# Patient Record
Sex: FEMALE | Race: WHITE | NOT HISPANIC OR LATINO
[De-identification: names, ages, dates, MRNs, and addresses within clinical notes are randomized per-mention and may not be internally consistent; named-entity substitution may affect disease eponyms.]

---

## 2017-04-26 ENCOUNTER — MEDICATION RENEWAL (OUTPATIENT)
Age: 52
End: 2017-04-26

## 2017-05-02 ENCOUNTER — RX RENEWAL (OUTPATIENT)
Age: 52
End: 2017-05-02

## 2017-05-24 ENCOUNTER — APPOINTMENT (OUTPATIENT)
Dept: PULMONOLOGY | Facility: CLINIC | Age: 52
End: 2017-05-24

## 2017-05-24 VITALS — OXYGEN SATURATION: 94 % | HEART RATE: 96 BPM | SYSTOLIC BLOOD PRESSURE: 128 MMHG | DIASTOLIC BLOOD PRESSURE: 82 MMHG

## 2017-05-24 VITALS — BODY MASS INDEX: 47.75 KG/M2 | WEIGHT: 213 LBS

## 2017-05-24 RX ORDER — CEPHALEXIN 500 MG/1
500 CAPSULE ORAL
Qty: 14 | Refills: 0 | Status: DISCONTINUED | COMMUNITY
Start: 2017-04-20

## 2017-05-24 RX ORDER — CEFDINIR 300 MG/1
300 CAPSULE ORAL
Qty: 20 | Refills: 0 | Status: DISCONTINUED | COMMUNITY
Start: 2017-05-20

## 2017-05-24 RX ORDER — LEVOFLOXACIN 500 MG/1
500 TABLET, FILM COATED ORAL
Qty: 10 | Refills: 0 | Status: DISCONTINUED | COMMUNITY
Start: 2017-05-13

## 2017-09-21 ENCOUNTER — APPOINTMENT (OUTPATIENT)
Dept: PULMONOLOGY | Facility: CLINIC | Age: 52
End: 2017-09-21

## 2019-08-08 ENCOUNTER — APPOINTMENT (OUTPATIENT)
Dept: PULMONOLOGY | Facility: CLINIC | Age: 54
End: 2019-08-08
Payer: COMMERCIAL

## 2019-08-08 VITALS
HEART RATE: 113 BPM | SYSTOLIC BLOOD PRESSURE: 140 MMHG | BODY MASS INDEX: 47.24 KG/M2 | WEIGHT: 210 LBS | DIASTOLIC BLOOD PRESSURE: 90 MMHG | HEIGHT: 56 IN | OXYGEN SATURATION: 93 %

## 2019-08-08 VITALS — RESPIRATION RATE: 16 BRPM

## 2019-08-08 DIAGNOSIS — E66.9 OBESITY, UNSPECIFIED: ICD-10-CM

## 2019-08-08 PROCEDURE — 94060 EVALUATION OF WHEEZING: CPT

## 2019-08-08 PROCEDURE — 94664 DEMO&/EVAL PT USE INHALER: CPT | Mod: 59

## 2019-08-08 PROCEDURE — 99215 OFFICE O/P EST HI 40 MIN: CPT | Mod: 25

## 2019-08-08 RX ORDER — BUDESONIDE AND FORMOTEROL FUMARATE DIHYDRATE 160; 4.5 UG/1; UG/1
160-4.5 AEROSOL RESPIRATORY (INHALATION) TWICE DAILY
Refills: 5 | Status: DISCONTINUED | COMMUNITY
Start: 2017-05-02 | End: 2019-08-08

## 2019-08-08 RX ORDER — TAMOXIFEN CITRATE 20 MG/1
20 TABLET, FILM COATED ORAL
Qty: 30 | Refills: 0 | Status: DISCONTINUED | COMMUNITY
Start: 2017-04-28 | End: 2019-08-08

## 2019-08-08 NOTE — HISTORY OF PRESENT ILLNESS
[FreeTextEntry1] : The patient is a 54-year-old female last seen here 2 years ago. She is morbidly obese and has a history of severe obstructive sleep apnea for which she is not currently using CPAP. She has it at home.  She has a history of moderate COPD currently on generic Advair. She was complaining of increasing shortness of breath with a dry cough. She was seen in urgent care where chest x-ray was taken. She was told she had "the beginnings of pneumonia" and was given antibiotics as well as a course of prednisone. She is currently on the prednisone. She is better but still remains with a dry cough.

## 2019-08-08 NOTE — PHYSICAL EXAM
[General Appearance - Well Developed] : well developed [No Deformities] : no deformities [General Appearance - Well Nourished] : well nourished [General Appearance - In No Acute Distress] : no acute distress [Normal Conjunctiva] : the conjunctiva exhibited no abnormalities [Neck Appearance] : the appearance of the neck was normal [Heart Rate And Rhythm] : heart rate and rhythm were normal [Heart Sounds] : normal S1 and S2 [Jugular Venous Distention Increased] : there was no jugular-venous distention [Arterial Pulses Normal] : the arterial pulses were normal [Murmurs] : no murmurs present [Exaggerated Use Of Accessory Muscles For Inspiration] : no accessory muscle use [] : no respiratory distress [Respiration, Rhythm And Depth] : normal respiratory rhythm and effort [Bowel Sounds] : normal bowel sounds [Auscultation Breath Sounds / Voice Sounds] : lungs were clear to auscultation bilaterally [Abdomen Soft] : soft [Abnormal Walk] : normal gait [Skin Color & Pigmentation] : normal skin color and pigmentation [Deep Tendon Reflexes (DTR)] : deep tendon reflexes were 2+ and symmetric [Cranial Nerves] : cranial nerves 2-12 were intact [Oriented To Time, Place, And Person] : oriented to person, place, and time [Normal Oral Mucosa] : normal oral mucosa [Impaired Insight] : insight and judgment were intact [No Oral Cyanosis] : no oral cyanosis [Cyanosis, Localized] : no localized cyanosis [Nail Clubbing] : no clubbing of the fingernails [FreeTextEntry1] : obese

## 2019-08-08 NOTE — CONSULT LETTER
[Courtesy Letter:] : I had the pleasure of seeing your patient, [unfilled], in my office today. [Please see my note below.] : Please see my note below. [Dear  ___] : Dear  [unfilled], [Sincerely,] : Sincerely, [Consult Closing:] : Thank you very much for allowing me to participate in the care of this patient.  If you have any questions, please do not hesitate to contact me. [FreeTextEntry3] : Angelina Montelongo MD FCCP\par D-ABSM\par ABIM board certified in  Pulmonary diseases, Sleep medicine\par Internal medicine\par

## 2019-08-08 NOTE — REVIEW OF SYSTEMS
[Fatigue] : fatigue [Recent Wt Gain (___ Lbs)] : recent [unfilled] ~Ulb weight gain [As Noted in HPI] : as noted in HPI [Itchy Eyes] : itching of ~T the eyes [Watery Eyes] : ~T eyes watering / discharge [Heartburn] : heartburn [Reflux] : reflux [Snoring] : snoring [Witnessed Apneas] : demonstrated ~M apnea [Nonrestorative Sleep] : nonrestorative sleep [Hypersomnolence] : sleeping much more than usual [Fever] : no fever [Chills] : no chills [Nasal Congestion] : no nasal congestion [Poor Appetite] : normal appetite  [Postnasal Drip] : no postnasal drip [Sinus Problems] : no sinus problems [PND] : no PND [Orthopnea] : no orthopnea [Murmurs] : no murmurs were heard [Dysrhythmia] : no dysrhythmia [Palpitations] : no palpitations [Edema] : ~T edema was not present [Indigestion] : no indigestion [Nasal Discharge] : no nasal discharge [Dysphagia] : no dysphagia [Nausea] : no nausea [Vomiting] : no vomiting [Nocturia] : no nocturia [Raynaud] : no Raynaud's phenomenon was observed [Frequency] : no change in urinary frequency [Headache] : no headache [Scleroderma] : no scleroderma [Dizziness] : no dizziness [Syncope] : no fainting

## 2019-08-27 ENCOUNTER — TRANSCRIPTION ENCOUNTER (OUTPATIENT)
Age: 54
End: 2019-08-27

## 2019-08-27 ENCOUNTER — OUTPATIENT (OUTPATIENT)
Dept: OUTPATIENT SERVICES | Facility: HOSPITAL | Age: 54
LOS: 1 days | Discharge: HOME | End: 2019-08-27
Payer: COMMERCIAL

## 2019-08-27 VITALS
DIASTOLIC BLOOD PRESSURE: 60 MMHG | OXYGEN SATURATION: 96 % | RESPIRATION RATE: 18 BRPM | HEART RATE: 60 BPM | SYSTOLIC BLOOD PRESSURE: 141 MMHG

## 2019-08-27 VITALS
SYSTOLIC BLOOD PRESSURE: 171 MMHG | HEART RATE: 100 BPM | OXYGEN SATURATION: 93 % | TEMPERATURE: 98 F | DIASTOLIC BLOOD PRESSURE: 85 MMHG | RESPIRATION RATE: 20 BRPM

## 2019-08-27 DIAGNOSIS — R94.39 ABNORMAL RESULT OF OTHER CARDIOVASCULAR FUNCTION STUDY: ICD-10-CM

## 2019-08-27 DIAGNOSIS — Z90.13 ACQUIRED ABSENCE OF BILATERAL BREASTS AND NIPPLES: Chronic | ICD-10-CM

## 2019-08-27 DIAGNOSIS — Z90.710 ACQUIRED ABSENCE OF BOTH CERVIX AND UTERUS: Chronic | ICD-10-CM

## 2019-08-27 DIAGNOSIS — Z98.890 OTHER SPECIFIED POSTPROCEDURAL STATES: Chronic | ICD-10-CM

## 2019-08-27 LAB
ANION GAP SERPL CALC-SCNC: 11 MMOL/L — SIGNIFICANT CHANGE UP (ref 5–17)
APTT BLD: 29.6 SEC — SIGNIFICANT CHANGE UP (ref 27.5–36.3)
BUN SERPL-MCNC: 9 MG/DL — SIGNIFICANT CHANGE UP (ref 8–20)
CALCIUM SERPL-MCNC: 9.8 MG/DL — SIGNIFICANT CHANGE UP (ref 8.6–10.2)
CHLORIDE SERPL-SCNC: 102 MMOL/L — SIGNIFICANT CHANGE UP (ref 98–107)
CO2 SERPL-SCNC: 29 MMOL/L — SIGNIFICANT CHANGE UP (ref 22–29)
CREAT SERPL-MCNC: 0.56 MG/DL — SIGNIFICANT CHANGE UP (ref 0.5–1.3)
GLUCOSE SERPL-MCNC: 104 MG/DL — SIGNIFICANT CHANGE UP (ref 70–115)
HCG SERPL-ACNC: <4 MIU/ML — SIGNIFICANT CHANGE UP
HCT VFR BLD CALC: 37.7 % — SIGNIFICANT CHANGE UP (ref 34.5–45)
HGB BLD-MCNC: 11.4 G/DL — LOW (ref 11.5–15.5)
INR BLD: 1.11 RATIO — SIGNIFICANT CHANGE UP (ref 0.88–1.16)
MAGNESIUM SERPL-MCNC: 2 MG/DL — SIGNIFICANT CHANGE UP (ref 1.6–2.6)
MCHC RBC-ENTMCNC: 25.4 PG — LOW (ref 27–34)
MCHC RBC-ENTMCNC: 30.2 GM/DL — LOW (ref 32–36)
MCV RBC AUTO: 84 FL — SIGNIFICANT CHANGE UP (ref 80–100)
PLATELET # BLD AUTO: 283 K/UL — SIGNIFICANT CHANGE UP (ref 150–400)
POTASSIUM SERPL-MCNC: 4.4 MMOL/L — SIGNIFICANT CHANGE UP (ref 3.5–5.3)
POTASSIUM SERPL-SCNC: 4.4 MMOL/L — SIGNIFICANT CHANGE UP (ref 3.5–5.3)
PROTHROM AB SERPL-ACNC: 12.8 SEC — SIGNIFICANT CHANGE UP (ref 10–12.9)
RBC # BLD: 4.49 M/UL — SIGNIFICANT CHANGE UP (ref 3.8–5.2)
RBC # FLD: 15.5 % — HIGH (ref 10.3–14.5)
SODIUM SERPL-SCNC: 142 MMOL/L — SIGNIFICANT CHANGE UP (ref 135–145)
WBC # BLD: 8.86 K/UL — SIGNIFICANT CHANGE UP (ref 3.8–10.5)
WBC # FLD AUTO: 8.86 K/UL — SIGNIFICANT CHANGE UP (ref 3.8–10.5)

## 2019-08-27 PROCEDURE — 93458 L HRT ARTERY/VENTRICLE ANGIO: CPT

## 2019-08-27 PROCEDURE — 85610 PROTHROMBIN TIME: CPT

## 2019-08-27 PROCEDURE — C1894: CPT

## 2019-08-27 PROCEDURE — C1769: CPT

## 2019-08-27 PROCEDURE — 84702 CHORIONIC GONADOTROPIN TEST: CPT

## 2019-08-27 PROCEDURE — 36415 COLL VENOUS BLD VENIPUNCTURE: CPT

## 2019-08-27 PROCEDURE — 83735 ASSAY OF MAGNESIUM: CPT

## 2019-08-27 PROCEDURE — 85730 THROMBOPLASTIN TIME PARTIAL: CPT

## 2019-08-27 PROCEDURE — 99153 MOD SED SAME PHYS/QHP EA: CPT

## 2019-08-27 PROCEDURE — 93010 ELECTROCARDIOGRAM REPORT: CPT

## 2019-08-27 PROCEDURE — 80048 BASIC METABOLIC PNL TOTAL CA: CPT

## 2019-08-27 PROCEDURE — 93005 ELECTROCARDIOGRAM TRACING: CPT

## 2019-08-27 PROCEDURE — 85027 COMPLETE CBC AUTOMATED: CPT

## 2019-08-27 PROCEDURE — 99152 MOD SED SAME PHYS/QHP 5/>YRS: CPT

## 2019-08-27 PROCEDURE — C1887: CPT

## 2019-08-27 RX ORDER — SODIUM CHLORIDE 9 MG/ML
300 INJECTION INTRAMUSCULAR; INTRAVENOUS; SUBCUTANEOUS
Refills: 0 | Status: DISCONTINUED | OUTPATIENT
Start: 2019-08-27 | End: 2019-09-11

## 2019-08-27 RX ORDER — OXYBUTYNIN CHLORIDE 5 MG
1 TABLET ORAL
Qty: 0 | Refills: 0 | DISCHARGE

## 2019-08-27 RX ORDER — BUDESONIDE AND FORMOTEROL FUMARATE DIHYDRATE 160; 4.5 UG/1; UG/1
2 AEROSOL RESPIRATORY (INHALATION)
Qty: 0 | Refills: 0 | DISCHARGE

## 2019-08-27 RX ADMIN — SODIUM CHLORIDE 75 MILLILITER(S): 9 INJECTION INTRAMUSCULAR; INTRAVENOUS; SUBCUTANEOUS at 18:34

## 2019-08-27 NOTE — DISCHARGE NOTE PROVIDER - HOSPITAL COURSE
s/p LHC via #6 RFA sheath    VENTRICLES: There were no left ventricular global or regional wall motion    abnormalities. Global left ventricular function was normal. EF estimated    was 60 %.    VALVES: MITRAL VALVE: The mitral valve exhibited trivial regurgitation    (less than 1+).    CORONARY VESSELS: The coronary circulation is right dominant.    LM:   --  LM: Normal.    LAD:   --  LAD: Normal.    CX:   --  Circumflex: Normal.    RCA:   --  RCA: Normal.    Tolerated procedure well w/o complications    Seen post procedure by Dr Valadez                    REVIEW OF SYSTEMS:    Denies SOB, CP, NV, HA, dizziness, palpitations, site pain        PHYSICAL EXAM: A&Ox3 NAD Skin warm and dry    NEURO: Speech intact +gag +swallow Tongue midline VILLAFANA    NECK: No JVD, trachea midline. Eupneic    HEART: RRR S1S2 no ectopy of CM    PULMONARY:  CTA sangita    ABDOMEN: Soft nontender X4 +BS Vdg/eating    EXTREMITIES:  RFA site: No bleed, hematoma, pain, ecchymosis or swelling Rt DP/PT+ s/p LHC via #6 RFA sheath    VENTRICLES: There were no left ventricular global or regional wall motion    abnormalities. Global left ventricular function was normal. EF estimated    was 60 %.    VALVES: MITRAL VALVE: The mitral valve exhibited trivial regurgitation    (less than 1+).    CORONARY VESSELS: The coronary circulation is right dominant.    LM:   --  LM: Normal.    LAD:   --  LAD: Normal.    CX:   --  Circumflex: Normal.    RCA:   --  RCA: Normal.    Tolerated procedure well w/o complications    Seen post procedure by Dr Valadez                    REVIEW OF SYSTEMS:    Denies SOB, CP, NV, HA, dizziness, palpitations, site pain OOB ambulating w/o complications        PHYSICAL EXAM: A&Ox3 NAD Skin warm and dry    NEURO: Speech intact +gag +swallow Tongue midline VILLAFANA    NECK: No JVD, trachea midline. Eupneic    HEART: RRR S1S2 no ectopy of CM    PULMONARY:  CTA sangita    ABDOMEN: Soft nontender X4 +BS Vdg/eating    EXTREMITIES:  RFA site: No bleed, hematoma, pain, ecchymosis or swelling Rt DP/PT+

## 2019-08-27 NOTE — H&P PST ADULT - HISTORY OF PRESENT ILLNESS
This is a 55 y/o female with h/o COPD, morbid obesity, SUNI (noncompliant with CPAP), breast CA s/p double mastectomy with no chemo/radiation, and strong family h/o of premature CAD (Brother CAD and MI 30-40s y/o) with c/o SOB.  Exercise stress test was stopped after stage 1 due to severe SOB.  NST was done and revealed moderate ischemia of the distal lateral/inferolateral wall and EF 57%.  Patient presents today for elective LHC +/- PCI with Dr. Valadez.

## 2019-08-27 NOTE — H&P PST ADULT - NSICDXPASTMEDICALHX_GEN_ALL_CORE_FT
PAST MEDICAL HISTORY:  Breast cancer s/p mastectomy (no chemo/radiation)    History of COPD     Morbid obesity     SUNI (obstructive sleep apnea)

## 2019-08-27 NOTE — DISCHARGE NOTE PROVIDER - CARE PROVIDERS DIRECT ADDRESSES
"Anesthesia Transfer of Care Note    Patient: Kylah Ruiz    Procedure(s) Performed: Procedure(s) (LRB):  PARATHYROIDECTOMY  1.5 hours  (Right)    Patient location: PACU    Anesthesia Type: general    Transport from OR: Transported from OR on room air with adequate spontaneous ventilation    Post pain: adequate analgesia    Post assessment: no apparent anesthetic complications    Post vital signs: stable    Level of consciousness: responds to stimulation    Nausea/Vomiting: no nausea/vomiting    Complications: none          Last vitals:   Visit Vitals    /63 (BP Location: Right arm, Patient Position: Sitting, BP Method: Automatic)    Pulse (!) 58    Temp 36.7 °C (98 °F)    Resp 16    Ht 5' 2" (1.575 m)    Wt 75.4 kg (166 lb 3.6 oz)    LMP 02/18/2017    SpO2 97%    Breastfeeding No    BMI 30.4 kg/m2     " ,DirectAddress_Unknown

## 2019-08-27 NOTE — DISCHARGE NOTE NURSING/CASE MANAGEMENT/SOCIAL WORK - PATIENT PORTAL LINK FT
You can access the FollowMyHealth Patient Portal offered by [facility] by registering at the following website: [email or web address]. By joining [employer]’s FollowMyHealth portal, you will also be able to view your health information using other applications (apps) compatible with our system.

## 2019-08-27 NOTE — H&P PST ADULT - ASSESSMENT
53 y/o female with severe SOB and moderate ischemia  of the distal lateral/inferolateral wall on NST for LHC +/- PCI with Dr. Valadez.

## 2019-08-27 NOTE — DISCHARGE NOTE PROVIDER - NSDCFUSCHEDAPPT_GEN_ALL_CORE_FT
OLIVA TORREZ ; 10/10/2019 ; [facility] OLIVA TORREZ ; 10/10/2019 ; NPP [facility]

## 2019-08-27 NOTE — H&P PST ADULT - NSICDXPASTSURGICALHX_GEN_ALL_CORE_FT
PAST SURGICAL HISTORY:  H/O bilateral mastectomy     H/O: hysterectomy     S/P breast reconstruction, bilateral

## 2019-08-27 NOTE — H&P PST ADULT - NSICDXFAMILYHX_GEN_ALL_CORE_FT
FAMILY HISTORY:  Mother  Still living? No  FHx: myocardial infarction, Age at diagnosis: 61-70    Sibling  Still living? Yes, Estimated age: Age Unknown  Family history of premature CAD, Age at diagnosis: 31-40  FHx: myocardial infarction, Age at diagnosis: 41-50

## 2019-08-30 DIAGNOSIS — I20.8 OTHER FORMS OF ANGINA PECTORIS: ICD-10-CM

## 2019-10-10 ENCOUNTER — APPOINTMENT (OUTPATIENT)
Dept: PULMONOLOGY | Facility: CLINIC | Age: 54
End: 2019-10-10

## 2022-01-28 PROBLEM — C50.919 MALIGNANT NEOPLASM OF UNSPECIFIED SITE OF UNSPECIFIED FEMALE BREAST: Chronic | Status: ACTIVE | Noted: 2019-08-27

## 2022-01-28 PROBLEM — G47.33 OBSTRUCTIVE SLEEP APNEA (ADULT) (PEDIATRIC): Chronic | Status: ACTIVE | Noted: 2019-08-27

## 2022-01-28 PROBLEM — Z87.09 PERSONAL HISTORY OF OTHER DISEASES OF THE RESPIRATORY SYSTEM: Chronic | Status: ACTIVE | Noted: 2019-08-27

## 2022-01-28 PROBLEM — E66.01 MORBID (SEVERE) OBESITY DUE TO EXCESS CALORIES: Chronic | Status: ACTIVE | Noted: 2019-08-27

## 2022-02-07 ENCOUNTER — APPOINTMENT (OUTPATIENT)
Dept: PULMONOLOGY | Facility: CLINIC | Age: 57
End: 2022-02-07
Payer: COMMERCIAL

## 2022-02-07 VITALS
HEIGHT: 57 IN | WEIGHT: 225 LBS | BODY MASS INDEX: 48.54 KG/M2 | SYSTOLIC BLOOD PRESSURE: 132 MMHG | RESPIRATION RATE: 16 BRPM | DIASTOLIC BLOOD PRESSURE: 84 MMHG | HEART RATE: 96 BPM | OXYGEN SATURATION: 98 %

## 2022-02-07 DIAGNOSIS — J12.82 COVID-19: ICD-10-CM

## 2022-02-07 DIAGNOSIS — U07.1 COVID-19: ICD-10-CM

## 2022-02-07 PROCEDURE — 99214 OFFICE O/P EST MOD 30 MIN: CPT

## 2022-02-07 NOTE — CONSULT LETTER
[Dear  ___] : Dear  [unfilled], [Consult Letter:] : I had the pleasure of evaluating your patient, [unfilled]. [Please see my note below.] : Please see my note below. [Consult Closing:] : Thank you very much for allowing me to participate in the care of this patient.  If you have any questions, please do not hesitate to contact me. [Sincerely,] : Sincerely, [FreeTextEntry3] : Angelina Montelongo MD FCCP\par D-ABSM\par ABIM board certified in  Pulmonary diseases, Sleep medicine\par Internal medicine\par  [DrHal  ___] : Dr. BURRIS

## 2022-02-07 NOTE — ASSESSMENT
[FreeTextEntry1] : The patient had incidental Covid 19 with some infiltrates consistent with infection. She has an elevated resting heart rate which spikes with walking. There is minimal desaturation. This is consistent with a post covid course. Pulmonary hypertension is likely on the basis of untreated severe sleep apnea compounded by restrictive disease from obesity and obstructive lung disease. She is on Trelegy for the obstructive lung disease. I told her that we must start to treat her sleep apnea. Unfortunately her sleep study is quite old. A home sleep study has been ordered and I will see her back after that to prescribe CPAP. The patient will likely recover from her Covid pneumonia, but there is no indication for followup imaging at this point because the CT scan may take several months to improve even after she's feeling better.

## 2022-02-07 NOTE — PHYSICAL EXAM
[No Acute Distress] : no acute distress [Normal Oropharynx] : normal oropharynx [III] : Mallampati Class: III [Normal Appearance] : normal appearance [No Neck Mass] : no neck mass [Normal Rate/Rhythm] : normal rate/rhythm [Normal S1, S2] : normal s1, s2 [No Murmurs] : no murmurs [No Resp Distress] : no resp distress [Clear to Auscultation Bilaterally] : clear to auscultation bilaterally [No Abnormalities] : no abnormalities [Benign] : benign [Normal Gait] : normal gait [No Clubbing] : no clubbing [No Cyanosis] : no cyanosis [No Edema] : no edema [FROM] : FROM [Normal Color/ Pigmentation] : normal color/ pigmentation [No Focal Deficits] : no focal deficits [Oriented x3] : oriented x3 [Normal Affect] : normal affect [TextBox_2] : Obese

## 2022-02-07 NOTE — HISTORY OF PRESENT ILLNESS
[TextBox_4] : Patient has not been seen here in 2-1/2 years. She has severe obstructive sleep apnea and refused treatment in the past. She has some obstructive lung disease compounded by obesity with restriction from that. Approximately 6 weeks ago she had a urinary tract infection. At the time she was seen in the [facility] emergency room and COVID swab was positive. He is on vaccinated. She has been complaining of some shortness of breath. A CT scan of the chest was performed last week demonstrating bilateral infiltrates as well as dilated pulmonary arteries. This was a noncontrast film.Denies cough or sputum, denies wheeze.

## 2022-02-07 NOTE — REASON FOR VISIT
[Follow-Up] : a follow-up visit [Abnormal CXR/ Chest CT] : an abnormal CXR/ chest CT [Sleep Apnea] : sleep apnea [Pulmonary Hypertension] : pulmonary hypertension [Shortness of Breath] : shortness of breath

## 2022-02-26 ENCOUNTER — OUTPATIENT (OUTPATIENT)
Dept: OUTPATIENT SERVICES | Facility: HOSPITAL | Age: 57
LOS: 1 days | End: 2022-02-26
Payer: COMMERCIAL

## 2022-02-26 ENCOUNTER — APPOINTMENT (OUTPATIENT)
Age: 57
End: 2022-02-26
Payer: COMMERCIAL

## 2022-02-26 DIAGNOSIS — Z98.890 OTHER SPECIFIED POSTPROCEDURAL STATES: Chronic | ICD-10-CM

## 2022-02-26 DIAGNOSIS — G47.33 OBSTRUCTIVE SLEEP APNEA (ADULT) (PEDIATRIC): ICD-10-CM

## 2022-02-26 DIAGNOSIS — Z90.13 ACQUIRED ABSENCE OF BILATERAL BREASTS AND NIPPLES: Chronic | ICD-10-CM

## 2022-02-26 DIAGNOSIS — Z90.710 ACQUIRED ABSENCE OF BOTH CERVIX AND UTERUS: Chronic | ICD-10-CM

## 2022-02-26 PROCEDURE — 95800 SLP STDY UNATTENDED: CPT

## 2022-04-08 ENCOUNTER — APPOINTMENT (OUTPATIENT)
Dept: PULMONOLOGY | Facility: CLINIC | Age: 57
End: 2022-04-08
Payer: COMMERCIAL

## 2022-04-08 VITALS
DIASTOLIC BLOOD PRESSURE: 78 MMHG | RESPIRATION RATE: 16 BRPM | HEIGHT: 57 IN | HEART RATE: 85 BPM | SYSTOLIC BLOOD PRESSURE: 128 MMHG | WEIGHT: 250 LBS | BODY MASS INDEX: 53.94 KG/M2 | OXYGEN SATURATION: 97 %

## 2022-04-08 PROCEDURE — 99214 OFFICE O/P EST MOD 30 MIN: CPT

## 2022-04-08 RX ORDER — PREDNISONE 10 MG/1
10 TABLET ORAL
Qty: 30 | Refills: 1 | Status: DISCONTINUED | COMMUNITY
Start: 2019-08-08 | End: 2022-04-08

## 2022-04-08 NOTE — ASSESSMENT
[FreeTextEntry1] : The patient has severe COPD and severe obstructive sleep apnea. Trelegy will be continued. Pulmonary function studies have been scheduled to see if there's been some worsening of over the years and may require further medications.\par \par Severe obstructive sleep apnea.AutoPAP will be ordered for the patient at 6 to 18 cm H2O.  The patient was instructed to begin wearing it with the goal being all night, every night.  Minimum acceptable use parameters were discussed with the patient.  Followup will be in 2-3 months to evaluate compliance and efficacy, and address any problems the patient may have with APAP.\par

## 2022-04-08 NOTE — PHYSICAL EXAM
[No Resp Distress] : no resp distress [Clear to Auscultation Bilaterally] : clear to auscultation bilaterally [TextBox_68] : Sofia quesadaat

## 2022-04-08 NOTE — HISTORY OF PRESENT ILLNESS
[TextBox_4] : Patient remains with dyspnea on exertion and Trelegy. She came in today to discuss her recent sleep study. [Obstructive Sleep Apnea] : obstructive sleep apnea [Home] : home [TextBox_100] : 2/22 [TextBox_108] : 62 [TextBox_112] : 57 [TextBox_116] : 45 [TextBox_165] : I reviewed the patient's sleep study with the patient.\par

## 2022-06-22 NOTE — ASSESSMENT
[FreeTextEntry1] : COPD exacerbation. The patient will go on a course of prednisone 40 mg daily for 5 days then taper. Her generic Advair was renewed.\par \par The patient has severe obstructive sleep apnea not being treated currently. I gave her nasal pillows to try. I will see her back in 2 weeks and I've asked her to bring her machine in. It is probably at the end of its useful life and I will try to order her new machine at that time. Oriented - self; Oriented - place; Oriented - time

## 2022-07-28 ENCOUNTER — APPOINTMENT (OUTPATIENT)
Dept: PULMONOLOGY | Facility: CLINIC | Age: 57
End: 2022-07-28

## 2024-05-06 ENCOUNTER — RESULT CHARGE (OUTPATIENT)
Age: 59
End: 2024-05-06

## 2024-05-07 ENCOUNTER — APPOINTMENT (OUTPATIENT)
Dept: PULMONOLOGY | Facility: CLINIC | Age: 59
End: 2024-05-07
Payer: COMMERCIAL

## 2024-05-07 VITALS
HEART RATE: 74 BPM | OXYGEN SATURATION: 93 % | SYSTOLIC BLOOD PRESSURE: 132 MMHG | BODY MASS INDEX: 48.14 KG/M2 | DIASTOLIC BLOOD PRESSURE: 82 MMHG | WEIGHT: 211 LBS | HEIGHT: 55.5 IN | RESPIRATION RATE: 16 BRPM

## 2024-05-07 VITALS — WEIGHT: 212 LBS | BODY MASS INDEX: 48.37 KG/M2 | HEIGHT: 55.5 IN

## 2024-05-07 DIAGNOSIS — J45.40 MODERATE PERSISTENT ASTHMA, UNCOMPLICATED: ICD-10-CM

## 2024-05-07 DIAGNOSIS — G47.33 OBSTRUCTIVE SLEEP APNEA (ADULT) (PEDIATRIC): ICD-10-CM

## 2024-05-07 DIAGNOSIS — Z71.89 OTHER SPECIFIED COUNSELING: ICD-10-CM

## 2024-05-07 PROCEDURE — 99215 OFFICE O/P EST HI 40 MIN: CPT | Mod: 25

## 2024-05-07 PROCEDURE — 94010 BREATHING CAPACITY TEST: CPT

## 2024-05-07 RX ORDER — FLUTICASONE PROPIONATE AND SALMETEROL 500; 50 UG/1; UG/1
500-50 POWDER RESPIRATORY (INHALATION) TWICE DAILY
Qty: 1 | Refills: 5 | Status: DISCONTINUED | COMMUNITY
Start: 2019-08-08 | End: 2024-05-07

## 2024-05-07 RX ORDER — TIOTROPIUM BROMIDE 18 UG/1
18 CAPSULE ORAL; RESPIRATORY (INHALATION) DAILY
Qty: 1 | Refills: 5 | Status: ACTIVE | COMMUNITY
Start: 2024-05-07 | End: 1900-01-01

## 2024-05-07 RX ORDER — HYDROCODONE BITARTRATE AND HOMATROPINE METHYLBROMIDE 5; 1.5 MG/5ML; MG/5ML
5-1.5 SYRUP ORAL
Qty: 180 | Refills: 0 | Status: DISCONTINUED | COMMUNITY
Start: 2017-05-13 | End: 2024-05-07

## 2024-05-07 RX ORDER — ALBUTEROL SULFATE 2.5 MG/3ML
(2.5 MG/3ML) SOLUTION RESPIRATORY (INHALATION)
Qty: 1 | Refills: 5 | Status: ACTIVE | COMMUNITY
Start: 2024-05-07 | End: 1900-01-01

## 2024-05-07 RX ORDER — BUDESONIDE AND FORMOTEROL FUMARATE DIHYDRATE 160; 4.5 UG/1; UG/1
160-4.5 AEROSOL RESPIRATORY (INHALATION)
Qty: 1 | Refills: 5 | Status: ACTIVE | COMMUNITY
Start: 2024-05-07 | End: 1900-01-01

## 2024-05-07 RX ORDER — LISINOPRIL 30 MG/1
TABLET ORAL
Refills: 0 | Status: ACTIVE | COMMUNITY

## 2024-05-07 RX ORDER — MONTELUKAST 10 MG/1
10 TABLET, FILM COATED ORAL
Qty: 1 | Refills: 5 | Status: ACTIVE | COMMUNITY
Start: 2024-05-07 | End: 1900-01-01

## 2024-05-07 RX ORDER — SULFAMETHOXAZOLE AND TRIMETHOPRIM 400; 80 MG/1; MG/1
400-80 TABLET ORAL
Qty: 14 | Refills: 0 | Status: DISCONTINUED | COMMUNITY
Start: 2017-04-26 | End: 2024-05-07

## 2024-05-07 RX ORDER — MONTELUKAST 10 MG/1
10 TABLET, FILM COATED ORAL
Refills: 0 | Status: ACTIVE | COMMUNITY

## 2024-05-07 RX ORDER — ALBUTEROL SULFATE 90 UG/1
108 (90 BASE) INHALANT RESPIRATORY (INHALATION)
Qty: 1 | Refills: 5 | Status: ACTIVE | COMMUNITY
Start: 2024-05-07 | End: 1900-01-01

## 2024-05-07 RX ORDER — INHALER, ASSIST DEVICES
SPACER (EA) MISCELLANEOUS
Qty: 1 | Refills: 1 | Status: ACTIVE | COMMUNITY
Start: 2024-05-07 | End: 1900-01-01

## 2024-05-07 NOTE — DISCUSSION/SUMMARY
[Asthma] : asthma [Decompensated] : decompensated [PFTs] : pulmonary function tests [Medication Changes Per Orders] : Medication changes are as documented in orders [de-identified] : due to lack of meds [Obstructive Sleep Apnea] : obstructive sleep apnea [Severe] : severe [Unchanged] : unchanged [Alcohol Avoidance] : alcohol avoidance [Sedative Avoidance] : sedative avoidance [Weight Loss Program] : weight loss program [de-identified] : resume CPAP

## 2024-05-07 NOTE — PHYSICAL EXAM
[No Acute Distress] : no acute distress [Normal Oropharynx] : normal oropharynx [IV] : Mallampati Class: IV [Normal Appearance] : normal appearance [Neck Circumference: ___] : neck circumference: [unfilled] [No Neck Mass] : no neck mass [Normal Rate/Rhythm] : normal rate/rhythm [Normal S1, S2] : normal s1, s2 [No Murmurs] : no murmurs [No Resp Distress] : no resp distress [Clear to Auscultation Bilaterally] : clear to auscultation bilaterally [No Abnormalities] : no abnormalities [Benign] : benign [Normal Gait] : normal gait [No Clubbing] : no clubbing [No Cyanosis] : no cyanosis [No Edema] : no edema [FROM] : FROM [Normal Color/ Pigmentation] : normal color/ pigmentation [No Focal Deficits] : no focal deficits [Oriented x3] : oriented x3 [Normal Affect] : normal affect [TextBox_2] : obese

## 2024-05-07 NOTE — HISTORY OF PRESENT ILLNESS
[Obstructive Sleep Apnea] : obstructive sleep apnea [Home] : home [APAP:] : APAP [Nasal mask] : nasal mask [Moderate Persistent] : moderate persistent [Wheezing] : denies wheezing [Cough] : denies coughing [Shortness Of Breath] : worsened shortness of breath [Chest Tightness Or Heavy Pressure] : worsened chest tightness [Feelings Of Weakness On Exertion] : denies reduced exercise tolerance [Cough at Night] : not awakened at night with cough [Wheezing at Night] : not awakened at night because of wheezing or trouble breathing [Cold] : cold weather [URI] : upper respiratory tract infection [Adherent] : the patient is not adherent with ~his/her~ medication regimen [Not Taking due to Cost of Med] : not taking the medication due to the cost [de-identified] : Severe obstructive sleep apnea, recurrent breast cancer, obesity, sinusitis [de-identified] : Last seen April 2022 by Dr. Montelongo [de-identified] : Heat [Awakes with Dry Mouth] : does not awaken with dry mouth [TextBox_77] : 0000 [TextBox_79] : 0410 [TextBox_81] : 10 [TextBox_89] : 1 [TextBox_100] : 2/22 [TextBox_108] : 60 [TextBox_112] : 55 [TextBox_116] : 43 [TextBox_125] : 6-18 [TextBox_158] : Rei [TextBox_160] : N30h [TextBox_165] : non-compliant

## 2024-05-07 NOTE — RESULTS/DATA
[TextEntry] : 1/28/2022: Noncontrast chest CT, [facility]-bilateral infiltrates with developing fibrotic residue.  Increased size of the pulmonary artery tree suggestive of pulmonary hypertension.  (Films reviewed on PACS)  5/3/2024: [place] imaging PET/CT demonstrates hypermetabolic 2.3 cm soft tissue lesion adjacent to biopsy clip of the left breast consistent with biopsy-proven malignancy.  Mildly active 4 x 2 cm soft tissue density inferior to the above lesion adjacent to the chest wall.  No mention of interstitial lung disease.

## 2024-05-07 NOTE — CONSULT LETTER
[Dear  ___] : Dear  [unfilled], [Consult Letter:] : I had the pleasure of evaluating your patient, [unfilled]. [Please see my note below.] : Please see my note below. [Consult Closing:] : Thank you very much for allowing me to participate in the care of this patient.  If you have any questions, please do not hesitate to contact me. [Sincerely,] : Sincerely, [FreeTextEntry3] : Wicho Bowser MD FCCP Pulmonary/Critical Care/Sleep Medicine Department of Internal Medicine  [facility]

## 2024-07-30 ENCOUNTER — APPOINTMENT (OUTPATIENT)
Dept: PULMONOLOGY | Facility: CLINIC | Age: 59
End: 2024-07-30

## 2025-05-06 ENCOUNTER — OUTPATIENT (OUTPATIENT)
Dept: OUTPATIENT SERVICES | Facility: HOSPITAL | Age: 60
LOS: 1 days | End: 2025-05-06
Payer: COMMERCIAL

## 2025-05-06 ENCOUNTER — APPOINTMENT (OUTPATIENT)
Dept: RADIOLOGY | Facility: CLINIC | Age: 60
End: 2025-05-06
Payer: COMMERCIAL

## 2025-05-06 ENCOUNTER — APPOINTMENT (OUTPATIENT)
Dept: PULMONOLOGY | Facility: CLINIC | Age: 60
End: 2025-05-06
Payer: COMMERCIAL

## 2025-05-06 VITALS
OXYGEN SATURATION: 94 % | RESPIRATION RATE: 16 BRPM | DIASTOLIC BLOOD PRESSURE: 80 MMHG | HEART RATE: 78 BPM | BODY MASS INDEX: 39.01 KG/M2 | SYSTOLIC BLOOD PRESSURE: 118 MMHG | WEIGHT: 171 LBS | HEIGHT: 55.5 IN

## 2025-05-06 DIAGNOSIS — J45.40 MODERATE PERSISTENT ASTHMA, UNCOMPLICATED: ICD-10-CM

## 2025-05-06 DIAGNOSIS — Z71.89 OTHER SPECIFIED COUNSELING: ICD-10-CM

## 2025-05-06 DIAGNOSIS — Z90.710 ACQUIRED ABSENCE OF BOTH CERVIX AND UTERUS: Chronic | ICD-10-CM

## 2025-05-06 DIAGNOSIS — R06.89 OTHER ABNORMALITIES OF BREATHING: ICD-10-CM

## 2025-05-06 DIAGNOSIS — G47.33 OBSTRUCTIVE SLEEP APNEA (ADULT) (PEDIATRIC): ICD-10-CM

## 2025-05-06 PROCEDURE — 71046 X-RAY EXAM CHEST 2 VIEWS: CPT

## 2025-05-06 PROCEDURE — 99215 OFFICE O/P EST HI 40 MIN: CPT

## 2025-05-06 PROCEDURE — G2211 COMPLEX E/M VISIT ADD ON: CPT | Mod: NC

## 2025-05-06 PROCEDURE — 71046 X-RAY EXAM CHEST 2 VIEWS: CPT | Mod: 26

## 2025-05-06 RX ORDER — LORATADINE 10 MG
10 CAPSULE ORAL
Refills: 0 | Status: ACTIVE | COMMUNITY

## 2025-05-06 RX ORDER — GABAPENTIN 300 MG/1
300 TABLET ORAL
Refills: 0 | Status: ACTIVE | COMMUNITY

## 2025-05-06 RX ORDER — CLOBETASOL PROPIONATE 0.05% / NIACINAMIDE 4% .05; 4 G/100G; G/100G
0.05-4 CREAM TOPICAL
Refills: 0 | Status: ACTIVE | COMMUNITY

## 2025-05-06 RX ORDER — VITAMIN E (DL,TOCOPHERYL ACET) 180 MG
CAPSULE ORAL
Refills: 0 | Status: ACTIVE | COMMUNITY

## 2025-05-06 RX ORDER — LIDOCAINE 4% 4 G/100G
4 CREAM TOPICAL
Refills: 0 | Status: ACTIVE | COMMUNITY

## 2025-05-06 RX ORDER — DEXAMETHASONE 4 MG/1
4 TABLET ORAL
Refills: 0 | Status: ACTIVE | COMMUNITY

## 2025-05-06 RX ORDER — NYSTATIN 100K UNIT
100000 TABLET VAGINAL
Refills: 0 | Status: ACTIVE | COMMUNITY

## 2025-05-06 RX ORDER — CEFPODOXIME PROXETIL 100 MG/1
100 TABLET, FILM COATED ORAL
Refills: 0 | Status: ACTIVE | COMMUNITY

## 2025-05-06 RX ORDER — ALBUTEROL SULFATE 4 MG/1
TABLET ORAL
Refills: 0 | Status: ACTIVE | COMMUNITY

## 2025-05-06 RX ORDER — ONDANSETRON 8 MG/1
8 TABLET ORAL
Refills: 0 | Status: ACTIVE | COMMUNITY

## 2025-05-06 RX ORDER — MONTELUKAST 10 MG/1
TABLET, FILM COATED ORAL
Refills: 0 | Status: ACTIVE | COMMUNITY

## 2025-05-06 RX ORDER — FAMOTIDINE 20 MG/1
20 TABLET, FILM COATED ORAL
Refills: 0 | Status: ACTIVE | COMMUNITY

## 2025-05-06 RX ORDER — OLANZAPINE 5 MG/1
5 TABLET, FILM COATED ORAL
Refills: 0 | Status: ACTIVE | COMMUNITY

## 2025-05-06 RX ORDER — ADO-TRASTUZUMAB EMTANSINE 20 MG/ML
INJECTION, POWDER, LYOPHILIZED, FOR SOLUTION INTRAVENOUS
Refills: 0 | Status: ACTIVE | COMMUNITY

## 2025-05-06 RX ORDER — LISINOPRIL 5 MG/1
5 TABLET ORAL
Refills: 0 | Status: ACTIVE | COMMUNITY

## 2025-05-06 RX ORDER — FOLIC ACID 1 MG/1
1 TABLET ORAL
Refills: 0 | Status: ACTIVE | COMMUNITY

## 2025-06-16 ENCOUNTER — OFFICE (OUTPATIENT)
Dept: URBAN - METROPOLITAN AREA CLINIC 115 | Facility: CLINIC | Age: 60
Setting detail: OPHTHALMOLOGY
End: 2025-06-16
Payer: COMMERCIAL

## 2025-06-16 DIAGNOSIS — H40.1131: ICD-10-CM

## 2025-06-16 DIAGNOSIS — H25.13: ICD-10-CM

## 2025-06-16 PROCEDURE — 92133 CPTRZD OPH DX IMG PST SGM ON: CPT | Performed by: OPHTHALMOLOGY

## 2025-06-16 PROCEDURE — 92014 COMPRE OPH EXAM EST PT 1/>: CPT | Performed by: OPHTHALMOLOGY

## 2025-06-16 ASSESSMENT — REFRACTION_AUTOREFRACTION
OD_CYLINDER: -5.25
OS_SPHERE: -0.25
OD_SPHERE: +0.25
OS_AXIS: 082
OD_AXIS: 088
OS_CYLINDER: -4.75

## 2025-06-16 ASSESSMENT — REFRACTION_CURRENTRX
OD_VPRISM_DIRECTION: PROGS
OS_AXIS: 086
OD_OVR_VA: 20/
OS_OVR_VA: 20/
OS_ADD: +2.50
OD_AXIS: 087
OS_CYLINDER: -4.75
OS_SPHERE: PLANO
OD_AXIS: 96
OS_CYLINDER: -4.00
OD_CYLINDER: -4.25
OD_CYLINDER: -4.00
OS_AXIS: 80
OD_OVR_VA: 20/
OD_SPHERE: +0.25
OS_SPHERE: -0.25
OD_ADD: +1.75
OD_SPHERE: +0.25
OS_ADD: +1.75
OD_VPRISM_DIRECTION: PROGS
OD_ADD: +2.00
OS_VPRISM_DIRECTION: PROGS
OS_VPRISM_DIRECTION: PROGS
OS_OVR_VA: 20/

## 2025-06-16 ASSESSMENT — PACHYMETRY
OD_CT_CORRECTION: -6
OS_CT_UM: 634
OD_CT_UM: 628
OS_CT_CORRECTION: -6

## 2025-06-16 ASSESSMENT — CONFRONTATIONAL VISUAL FIELD TEST (CVF)
OS_FINDINGS: FULL
OD_FINDINGS: FULL

## 2025-06-16 ASSESSMENT — VISUAL ACUITY
OS_BCVA: 20/40
OD_BCVA: 20/25

## 2025-07-09 ENCOUNTER — APPOINTMENT (OUTPATIENT)
Dept: PULMONOLOGY | Facility: CLINIC | Age: 60
End: 2025-07-09

## 2025-07-17 ENCOUNTER — APPOINTMENT (OUTPATIENT)
Dept: PHYSICAL MEDICINE AND REHAB | Facility: CLINIC | Age: 60
End: 2025-07-17
Payer: COMMERCIAL

## 2025-07-17 VITALS — HEIGHT: 57 IN | WEIGHT: 178 LBS | BODY MASS INDEX: 38.4 KG/M2

## 2025-07-17 PROBLEM — G62.9 NEUROPATHY: Status: ACTIVE | Noted: 2025-07-17

## 2025-07-17 PROBLEM — Z91.89 AT RISK FOR LYMPHEDEMA: Status: ACTIVE | Noted: 2025-07-17

## 2025-07-17 PROBLEM — I89.0 LYMPHEDEMA: Status: ACTIVE | Noted: 2025-07-17

## 2025-07-17 PROBLEM — R60.9 EDEMA: Status: ACTIVE | Noted: 2025-07-17

## 2025-07-17 PROBLEM — R29.898 HAND WEAKNESS: Status: ACTIVE | Noted: 2025-07-17

## 2025-07-17 PROBLEM — G89.28 POST-MASTECTOMY PAIN SYNDROME: Status: ACTIVE | Noted: 2025-07-17

## 2025-07-17 PROBLEM — Z86.79 HISTORY OF HIGH BLOOD PRESSURE: Status: RESOLVED | Noted: 2025-07-17 | Resolved: 2025-07-17

## 2025-07-17 PROCEDURE — 99205 OFFICE O/P NEW HI 60 MIN: CPT

## 2025-08-21 ENCOUNTER — APPOINTMENT (OUTPATIENT)
Dept: PHYSICAL MEDICINE AND REHAB | Facility: CLINIC | Age: 60
End: 2025-08-21
Payer: COMMERCIAL

## 2025-08-21 VITALS — HEIGHT: 57 IN | BODY MASS INDEX: 37.32 KG/M2 | WEIGHT: 173 LBS

## 2025-08-21 DIAGNOSIS — G62.9 POLYNEUROPATHY, UNSPECIFIED: ICD-10-CM

## 2025-08-21 DIAGNOSIS — Z91.89 OTHER SPECIFIED PERSONAL RISK FACTORS, NOT ELSEWHERE CLASSIFIED: ICD-10-CM

## 2025-08-21 DIAGNOSIS — I89.0 LYMPHEDEMA, NOT ELSEWHERE CLASSIFIED: ICD-10-CM

## 2025-08-21 DIAGNOSIS — G89.28 OTHER CHRONIC POSTPROCEDURAL PAIN: ICD-10-CM

## 2025-08-21 PROCEDURE — 99214 OFFICE O/P EST MOD 30 MIN: CPT
